# Patient Record
Sex: MALE | Race: WHITE
[De-identification: names, ages, dates, MRNs, and addresses within clinical notes are randomized per-mention and may not be internally consistent; named-entity substitution may affect disease eponyms.]

---

## 2021-02-01 ENCOUNTER — HOSPITAL ENCOUNTER (OUTPATIENT)
Dept: HOSPITAL 90 - WHH | Age: 66
Discharge: HOME | End: 2021-02-01
Attending: FAMILY MEDICINE
Payer: MEDICARE

## 2021-02-01 DIAGNOSIS — I70.90: ICD-10-CM

## 2021-02-01 DIAGNOSIS — E66.9: ICD-10-CM

## 2021-02-01 DIAGNOSIS — N28.9: ICD-10-CM

## 2021-02-01 DIAGNOSIS — M47.815: ICD-10-CM

## 2021-02-01 DIAGNOSIS — E11.628: ICD-10-CM

## 2021-02-01 DIAGNOSIS — L89.154: ICD-10-CM

## 2021-02-01 DIAGNOSIS — E78.5: ICD-10-CM

## 2021-02-01 DIAGNOSIS — L98.492: ICD-10-CM

## 2021-02-01 DIAGNOSIS — E11.622: Primary | ICD-10-CM

## 2021-02-01 PROCEDURE — 97605 NEG PRS WND THER DME<=50SQCM: CPT

## 2021-02-08 ENCOUNTER — HOSPITAL ENCOUNTER (OUTPATIENT)
Dept: HOSPITAL 90 - WHH | Age: 66
Discharge: HOME | End: 2021-02-08
Attending: FAMILY MEDICINE
Payer: MEDICARE

## 2021-02-08 DIAGNOSIS — N28.9: ICD-10-CM

## 2021-02-08 DIAGNOSIS — E78.5: ICD-10-CM

## 2021-02-08 DIAGNOSIS — I70.90: ICD-10-CM

## 2021-02-08 DIAGNOSIS — E11.622: Primary | ICD-10-CM

## 2021-02-08 DIAGNOSIS — E66.9: ICD-10-CM

## 2021-02-08 DIAGNOSIS — E11.628: ICD-10-CM

## 2021-02-08 DIAGNOSIS — L98.492: ICD-10-CM

## 2021-02-08 DIAGNOSIS — L89.154: ICD-10-CM

## 2021-02-08 DIAGNOSIS — M47.815: ICD-10-CM

## 2021-02-08 PROCEDURE — 97605 NEG PRS WND THER DME<=50SQCM: CPT

## 2021-02-22 ENCOUNTER — HOSPITAL ENCOUNTER (OUTPATIENT)
Dept: HOSPITAL 90 - WHH | Age: 66
Discharge: HOME | End: 2021-02-22
Attending: FAMILY MEDICINE
Payer: MEDICARE

## 2021-02-22 DIAGNOSIS — E78.5: ICD-10-CM

## 2021-02-22 DIAGNOSIS — E66.9: ICD-10-CM

## 2021-02-22 DIAGNOSIS — N28.9: ICD-10-CM

## 2021-02-22 DIAGNOSIS — E11.628: ICD-10-CM

## 2021-02-22 DIAGNOSIS — I70.90: ICD-10-CM

## 2021-02-22 DIAGNOSIS — M47.815: ICD-10-CM

## 2021-02-22 DIAGNOSIS — L89.154: ICD-10-CM

## 2021-02-22 DIAGNOSIS — E11.622: Primary | ICD-10-CM

## 2021-02-22 DIAGNOSIS — L98.492: ICD-10-CM

## 2021-02-22 PROCEDURE — 97605 NEG PRS WND THER DME<=50SQCM: CPT

## 2021-03-01 ENCOUNTER — HOSPITAL ENCOUNTER (OUTPATIENT)
Dept: HOSPITAL 90 - WHH | Age: 66
Discharge: HOME | End: 2021-03-01
Attending: FAMILY MEDICINE
Payer: MEDICARE

## 2021-03-01 DIAGNOSIS — E78.5: ICD-10-CM

## 2021-03-01 DIAGNOSIS — N28.9: ICD-10-CM

## 2021-03-01 DIAGNOSIS — E11.622: Primary | ICD-10-CM

## 2021-03-01 DIAGNOSIS — E11.628: ICD-10-CM

## 2021-03-01 DIAGNOSIS — L98.492: ICD-10-CM

## 2021-03-01 DIAGNOSIS — M47.815: ICD-10-CM

## 2021-03-01 DIAGNOSIS — E66.9: ICD-10-CM

## 2021-03-01 DIAGNOSIS — I70.90: ICD-10-CM

## 2021-03-01 DIAGNOSIS — L89.154: ICD-10-CM

## 2021-03-01 PROCEDURE — 97605 NEG PRS WND THER DME<=50SQCM: CPT

## 2021-03-01 PROCEDURE — 11042 DBRDMT SUBQ TIS 1ST 20SQCM/<: CPT

## 2021-03-08 ENCOUNTER — HOSPITAL ENCOUNTER (OUTPATIENT)
Dept: HOSPITAL 90 - WHH | Age: 66
Discharge: HOME | End: 2021-03-08
Attending: FAMILY MEDICINE
Payer: MEDICARE

## 2021-03-08 DIAGNOSIS — E78.5: ICD-10-CM

## 2021-03-08 DIAGNOSIS — L89.154: ICD-10-CM

## 2021-03-08 DIAGNOSIS — L98.492: ICD-10-CM

## 2021-03-08 DIAGNOSIS — M47.815: ICD-10-CM

## 2021-03-08 DIAGNOSIS — I70.90: ICD-10-CM

## 2021-03-08 DIAGNOSIS — N28.9: ICD-10-CM

## 2021-03-08 DIAGNOSIS — E66.9: ICD-10-CM

## 2021-03-08 DIAGNOSIS — E11.622: Primary | ICD-10-CM

## 2021-03-08 DIAGNOSIS — E11.628: ICD-10-CM

## 2021-03-08 PROCEDURE — 97605 NEG PRS WND THER DME<=50SQCM: CPT

## 2021-03-08 PROCEDURE — 11042 DBRDMT SUBQ TIS 1ST 20SQCM/<: CPT

## 2021-03-15 ENCOUNTER — HOSPITAL ENCOUNTER (OUTPATIENT)
Dept: HOSPITAL 90 - WHH | Age: 66
Discharge: HOME | End: 2021-03-15
Attending: FAMILY MEDICINE
Payer: MEDICARE

## 2021-03-15 DIAGNOSIS — L89.154: ICD-10-CM

## 2021-03-15 DIAGNOSIS — E78.5: ICD-10-CM

## 2021-03-15 DIAGNOSIS — E66.9: ICD-10-CM

## 2021-03-15 DIAGNOSIS — N28.9: ICD-10-CM

## 2021-03-15 DIAGNOSIS — E11.628: ICD-10-CM

## 2021-03-15 DIAGNOSIS — I70.90: ICD-10-CM

## 2021-03-15 DIAGNOSIS — E11.622: Primary | ICD-10-CM

## 2021-03-15 DIAGNOSIS — L98.492: ICD-10-CM

## 2021-03-15 DIAGNOSIS — M47.815: ICD-10-CM

## 2021-03-15 PROCEDURE — 97605 NEG PRS WND THER DME<=50SQCM: CPT

## 2021-03-22 ENCOUNTER — HOSPITAL ENCOUNTER (OUTPATIENT)
Dept: HOSPITAL 90 - WHH | Age: 66
Discharge: HOME | End: 2021-03-22
Attending: FAMILY MEDICINE
Payer: MEDICARE

## 2021-03-22 DIAGNOSIS — E66.9: ICD-10-CM

## 2021-03-22 DIAGNOSIS — E78.5: ICD-10-CM

## 2021-03-22 DIAGNOSIS — L98.492: ICD-10-CM

## 2021-03-22 DIAGNOSIS — E11.622: Primary | ICD-10-CM

## 2021-03-22 DIAGNOSIS — I70.90: ICD-10-CM

## 2021-03-22 DIAGNOSIS — M47.815: ICD-10-CM

## 2021-03-22 DIAGNOSIS — E11.628: ICD-10-CM

## 2021-03-22 DIAGNOSIS — N28.9: ICD-10-CM

## 2021-03-22 DIAGNOSIS — L89.154: ICD-10-CM

## 2021-03-22 PROCEDURE — 15271 SKIN SUB GRAFT TRNK/ARM/LEG: CPT

## 2021-03-29 ENCOUNTER — HOSPITAL ENCOUNTER (OUTPATIENT)
Dept: HOSPITAL 90 - WHH | Age: 66
Discharge: HOME | End: 2021-03-29
Attending: FAMILY MEDICINE
Payer: MEDICARE

## 2021-03-29 DIAGNOSIS — E66.9: ICD-10-CM

## 2021-03-29 DIAGNOSIS — E11.628: ICD-10-CM

## 2021-03-29 DIAGNOSIS — L89.154: ICD-10-CM

## 2021-03-29 DIAGNOSIS — E11.622: Primary | ICD-10-CM

## 2021-03-29 DIAGNOSIS — L98.492: ICD-10-CM

## 2021-03-29 DIAGNOSIS — I70.90: ICD-10-CM

## 2021-03-29 DIAGNOSIS — N28.9: ICD-10-CM

## 2021-03-29 DIAGNOSIS — M47.815: ICD-10-CM

## 2021-03-29 DIAGNOSIS — E78.5: ICD-10-CM

## 2021-03-29 PROCEDURE — 15271 SKIN SUB GRAFT TRNK/ARM/LEG: CPT

## 2021-04-05 ENCOUNTER — HOSPITAL ENCOUNTER (OUTPATIENT)
Dept: HOSPITAL 90 - WHH | Age: 66
Discharge: HOME | End: 2021-04-05
Attending: FAMILY MEDICINE
Payer: MEDICARE

## 2021-04-05 DIAGNOSIS — I70.90: ICD-10-CM

## 2021-04-05 DIAGNOSIS — N28.9: ICD-10-CM

## 2021-04-05 DIAGNOSIS — E78.5: ICD-10-CM

## 2021-04-05 DIAGNOSIS — L98.492: ICD-10-CM

## 2021-04-05 DIAGNOSIS — E11.628: ICD-10-CM

## 2021-04-05 DIAGNOSIS — L89.154: ICD-10-CM

## 2021-04-05 DIAGNOSIS — M47.815: ICD-10-CM

## 2021-04-05 DIAGNOSIS — E11.622: Primary | ICD-10-CM

## 2021-04-05 DIAGNOSIS — E66.9: ICD-10-CM

## 2021-04-05 PROCEDURE — 15271 SKIN SUB GRAFT TRNK/ARM/LEG: CPT

## 2021-04-12 ENCOUNTER — HOSPITAL ENCOUNTER (OUTPATIENT)
Dept: HOSPITAL 90 - WHH | Age: 66
Discharge: HOME | End: 2021-04-12
Attending: FAMILY MEDICINE
Payer: MEDICARE

## 2021-04-12 DIAGNOSIS — E11.628: ICD-10-CM

## 2021-04-12 DIAGNOSIS — E66.9: ICD-10-CM

## 2021-04-12 DIAGNOSIS — E11.622: Primary | ICD-10-CM

## 2021-04-12 DIAGNOSIS — E78.5: ICD-10-CM

## 2021-04-12 DIAGNOSIS — N28.9: ICD-10-CM

## 2021-04-12 DIAGNOSIS — L89.154: ICD-10-CM

## 2021-04-12 DIAGNOSIS — I70.90: ICD-10-CM

## 2021-04-12 DIAGNOSIS — M47.815: ICD-10-CM

## 2021-04-12 DIAGNOSIS — L98.492: ICD-10-CM

## 2021-04-12 PROCEDURE — 15271 SKIN SUB GRAFT TRNK/ARM/LEG: CPT

## 2021-04-19 ENCOUNTER — HOSPITAL ENCOUNTER (OUTPATIENT)
Dept: HOSPITAL 90 - WHH | Age: 66
Discharge: HOME | End: 2021-04-19
Attending: FAMILY MEDICINE
Payer: MEDICARE

## 2021-04-19 DIAGNOSIS — E11.628: ICD-10-CM

## 2021-04-19 DIAGNOSIS — E66.9: ICD-10-CM

## 2021-04-19 DIAGNOSIS — M47.815: ICD-10-CM

## 2021-04-19 DIAGNOSIS — E11.622: Primary | ICD-10-CM

## 2021-04-19 DIAGNOSIS — E78.5: ICD-10-CM

## 2021-04-19 DIAGNOSIS — N28.9: ICD-10-CM

## 2021-04-19 DIAGNOSIS — I70.90: ICD-10-CM

## 2021-04-19 DIAGNOSIS — L98.492: ICD-10-CM

## 2021-04-19 DIAGNOSIS — L89.154: ICD-10-CM

## 2021-04-19 PROCEDURE — 15271 SKIN SUB GRAFT TRNK/ARM/LEG: CPT

## 2021-04-26 ENCOUNTER — HOSPITAL ENCOUNTER (OUTPATIENT)
Dept: HOSPITAL 90 - WHH | Age: 66
Discharge: HOME | End: 2021-04-26
Attending: FAMILY MEDICINE
Payer: MEDICARE

## 2021-04-26 DIAGNOSIS — I70.90: ICD-10-CM

## 2021-04-26 DIAGNOSIS — E66.9: ICD-10-CM

## 2021-04-26 DIAGNOSIS — L89.154: ICD-10-CM

## 2021-04-26 DIAGNOSIS — E11.628: ICD-10-CM

## 2021-04-26 DIAGNOSIS — M47.815: ICD-10-CM

## 2021-04-26 DIAGNOSIS — N28.9: ICD-10-CM

## 2021-04-26 DIAGNOSIS — L98.492: ICD-10-CM

## 2021-04-26 DIAGNOSIS — E11.622: Primary | ICD-10-CM

## 2021-04-26 DIAGNOSIS — E78.5: ICD-10-CM

## 2021-05-03 ENCOUNTER — HOSPITAL ENCOUNTER (OUTPATIENT)
Dept: HOSPITAL 90 - WHH | Age: 66
Discharge: HOME | End: 2021-05-03
Attending: FAMILY MEDICINE
Payer: MEDICARE

## 2021-05-03 DIAGNOSIS — L89.154: ICD-10-CM

## 2021-05-03 DIAGNOSIS — E66.9: ICD-10-CM

## 2021-05-03 DIAGNOSIS — I70.90: ICD-10-CM

## 2021-05-03 DIAGNOSIS — L98.492: ICD-10-CM

## 2021-05-03 DIAGNOSIS — M47.815: ICD-10-CM

## 2021-05-03 DIAGNOSIS — N28.9: ICD-10-CM

## 2021-05-03 DIAGNOSIS — E11.628: ICD-10-CM

## 2021-05-03 DIAGNOSIS — E11.622: Primary | ICD-10-CM

## 2021-05-03 DIAGNOSIS — E78.5: ICD-10-CM

## 2021-05-03 PROCEDURE — 11042 DBRDMT SUBQ TIS 1ST 20SQCM/<: CPT

## 2021-05-10 ENCOUNTER — HOSPITAL ENCOUNTER (OUTPATIENT)
Dept: HOSPITAL 90 - WHH | Age: 66
Discharge: HOME | End: 2021-05-10
Attending: FAMILY MEDICINE
Payer: MEDICARE

## 2021-05-10 DIAGNOSIS — E66.9: ICD-10-CM

## 2021-05-10 DIAGNOSIS — E11.622: Primary | ICD-10-CM

## 2021-05-10 DIAGNOSIS — N28.9: ICD-10-CM

## 2021-05-10 DIAGNOSIS — L89.154: ICD-10-CM

## 2021-05-10 DIAGNOSIS — E78.5: ICD-10-CM

## 2021-05-10 DIAGNOSIS — M47.815: ICD-10-CM

## 2021-05-10 DIAGNOSIS — I70.90: ICD-10-CM

## 2021-05-10 DIAGNOSIS — E11.628: ICD-10-CM

## 2021-05-10 DIAGNOSIS — L98.492: ICD-10-CM

## 2021-05-17 ENCOUNTER — HOSPITAL ENCOUNTER (OUTPATIENT)
Dept: HOSPITAL 90 - WHH | Age: 66
Discharge: HOME | End: 2021-05-17
Attending: FAMILY MEDICINE
Payer: MEDICARE

## 2021-05-17 DIAGNOSIS — E66.9: ICD-10-CM

## 2021-05-17 DIAGNOSIS — N28.9: ICD-10-CM

## 2021-05-17 DIAGNOSIS — L98.492: ICD-10-CM

## 2021-05-17 DIAGNOSIS — L89.154: ICD-10-CM

## 2021-05-17 DIAGNOSIS — I70.90: ICD-10-CM

## 2021-05-17 DIAGNOSIS — E78.5: ICD-10-CM

## 2021-05-17 DIAGNOSIS — E11.628: ICD-10-CM

## 2021-05-17 DIAGNOSIS — M47.815: ICD-10-CM

## 2021-05-17 DIAGNOSIS — E11.622: Primary | ICD-10-CM

## 2024-08-16 ENCOUNTER — HOSPITAL ENCOUNTER (OUTPATIENT)
Dept: HOSPITAL 90 - RAH | Age: 69
Discharge: HOME | End: 2024-08-16
Attending: INTERNAL MEDICINE
Payer: MEDICARE

## 2024-08-16 ENCOUNTER — HOSPITAL ENCOUNTER (OUTPATIENT)
Dept: HOSPITAL 90 - RAH | Age: 69
Discharge: HOME | End: 2024-08-16
Attending: INTERNAL MEDICINE
Payer: COMMERCIAL

## 2024-08-16 DIAGNOSIS — I10: Primary | ICD-10-CM

## 2024-08-16 DIAGNOSIS — Z13.6: Primary | ICD-10-CM

## 2024-08-16 PROCEDURE — 75571 CT HRT W/O DYE W/CA TEST: CPT

## 2024-08-16 PROCEDURE — 93306 TTE W/DOPPLER COMPLETE: CPT

## 2024-09-17 ENCOUNTER — HOSPITAL ENCOUNTER (OUTPATIENT)
Dept: HOSPITAL 90 - SHCH | Age: 69
Discharge: HOME | End: 2024-09-17
Attending: INTERNAL MEDICINE
Payer: MEDICARE

## 2024-09-17 DIAGNOSIS — I65.23: ICD-10-CM

## 2024-09-17 DIAGNOSIS — R09.89: Primary | ICD-10-CM

## 2024-09-17 PROCEDURE — 93880 EXTRACRANIAL BILAT STUDY: CPT

## 2024-10-02 ENCOUNTER — HOSPITAL ENCOUNTER (OUTPATIENT)
Dept: HOSPITAL 90 - RAH | Age: 69
Discharge: HOME | End: 2024-10-02
Attending: FAMILY MEDICINE
Payer: MEDICARE

## 2024-10-02 DIAGNOSIS — R06.09: ICD-10-CM

## 2024-10-02 DIAGNOSIS — R14.0: Primary | ICD-10-CM

## 2024-10-02 DIAGNOSIS — R05.3: ICD-10-CM

## 2024-10-02 PROCEDURE — 74021 RADEX ABDOMEN 3+ VIEWS: CPT

## 2024-10-02 PROCEDURE — 71046 X-RAY EXAM CHEST 2 VIEWS: CPT

## 2025-03-16 ENCOUNTER — HOSPITAL ENCOUNTER (EMERGENCY)
Dept: HOSPITAL 90 - EDH | Age: 70
Discharge: HOME | End: 2025-03-16
Payer: MEDICARE

## 2025-03-16 VITALS
HEART RATE: 71 BPM | TEMPERATURE: 97.1 F | DIASTOLIC BLOOD PRESSURE: 68 MMHG | SYSTOLIC BLOOD PRESSURE: 137 MMHG | OXYGEN SATURATION: 98 % | RESPIRATION RATE: 20 BRPM

## 2025-03-16 VITALS — BODY MASS INDEX: 37.77 KG/M2 | HEIGHT: 66 IN | WEIGHT: 235.01 LBS

## 2025-03-16 DIAGNOSIS — S61.211A: Primary | ICD-10-CM
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
## 2025-03-16 DIAGNOSIS — Z79.82: ICD-10-CM
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
## 2025-03-16 DIAGNOSIS — Z79.899: ICD-10-CM

## 2025-03-16 DIAGNOSIS — Z79.84: ICD-10-CM

## 2025-03-16 DIAGNOSIS — Z88.5: ICD-10-CM

## 2025-03-16 PROCEDURE — 73140 X-RAY EXAM OF FINGER(S): CPT

## 2025-03-16 PROCEDURE — 12002 RPR S/N/AX/GEN/TRNK2.6-7.5CM: CPT

## 2025-03-16 PROCEDURE — 99283 EMERGENCY DEPT VISIT LOW MDM: CPT

## 2025-03-16 PROCEDURE — 12042 INTMD RPR N-HF/GENIT2.6-7.5: CPT

## 2025-03-16 PROCEDURE — 99284 EMERGENCY DEPT VISIT MOD MDM: CPT

## 2025-03-16 RX ADMIN — LIDOCAINE HYDROCHLORIDE STA ML: 10 INJECTION, SOLUTION INFILTRATION; PERINEURAL at 16:26

## 2025-03-16 RX ADMIN — NEOMYCIN AND POLYMYXIN B SULFATES AND BACITRACIN ZINC ONE APPL: 400; 3.5; 5 OINTMENT TOPICAL at 16:26

## 2025-03-16 NOTE — ERN
General


Stated Complaint:  CUT LEFT FINGER WITH 


Time Seen by MD:  15:40


Source:  patient





History of Present Illness


Initial Comments


PATIENT IS A 69-YEAR-OLD MALE COMING IN TO BE EVALUATED FOR LEFT HAND 2ND DIGIT 

WOUND.  PER PATIENT HE WAS GRINDING AND ACCIDENTALLY HIT HIMSELF IN THE 2ND 

DIGIT.


Allergies:  


Coded Allergies:  


     No Known Allergies (Verified  Allergy, Unknown, 4/12/23)


     morphine (Unverified  Allergy, Unknown, 4/10/15)


   HALLUCINATIONS


Home Meds


Reported Medications


[small pill]   No Conflict Check


   12/5/20


Vit B Cmplx 3/FA/Vit C/Biotin (Angeles-Brad Rx Tablet) 1 Each Tablet, 1 EACH PO 

DAILY, TAB


   12/5/20


Glipizide (Glipizide ER) 5 Mg Tab.er.24, 5 MG PO BID


   12/5/20


Allopurinol (Allopurinol) 300 Mg Tablet, 300 MG PO ONCE, TAB


   12/5/20


Gemfibrozil (Gemfibrozil) 600 Mg Tablet, 600 MG PO BID, TAB


   12/5/20


Aspirin (Aspirin) 81 Mg Tab.chew, 81 MG PO DAILY, TAB.CHEW


   12/5/20


Furosemide (Furosemide) 40 Mg Tablet, 40 MG PO DAILY, TAB


   12/5/20


Pravastatin Sodium (Pravastatin Sodium) 80 Mg Tablet, 80 MG PO DAILY, TAB


   12/5/20


Benazepril HCl (Benazepril HCl) 40 Mg Tablet, 40 MG PO DAILY, TAB


   12/5/20


Sodium Bicarbonate (Sodium Bicarbonate) 650 Mg Tablet, 650 MG PO BID, TAB


   12/5/20


[Fish Oil]   No Conflict Check, PO AM


   4/10/15


Benazepril HCl (Benazepril HCl) 40 Mg Tablet, 40 MG PO HS, TAB


   4/10/15


Cholecalciferol (Vitamin D3) (Vitamin D-3) 2,000 Unit Capsule, 1000 UNIT PO AM, 

CAP


   4/10/15


Multivitamin (Multivitamins) 1 Each Tablet, 1 EACH PO AM, TAB


   4/10/15


[Angeles Brad]   No Conflict Check, PO AM


   4/10/15


Aspirin (ASPIRIN 81 MG ECTAB) 81 Mg Ectab, 81 MG PO DAILY, TAB.EC


   4/10/15


Pravastatin Sodium (Pravastatin Sodium) 80 Mg Tablet, 80 MG PO HS, TAB


   4/10/15


Allopurinol (Allopurinol) 300 Mg Tablet, 300 MG PO AM, TAB


   4/10/15


Gemfibrozil (Gemfibrozil) 600 Mg Tablet, 600 MG PO BID, TAB


   4/10/15


Furosemide (Furosemide) 40 Mg Tablet, 40 MG PO AM, TAB


   4/10/15


Glipizide (Glipizide ER) 5 Mg Tab.er.24, 5 MG PO BID


   4/10/15





ROS Dictation


CONSTITUTIONAL:  NO CHILLS, NO FEVER, NO WEAKNESS, NO DIAPHORESIS, NO MALAISE.


HEAD/FACE:  NO SIGNS OF TRAUMA. 


EENT:  NO EYE PAIN, NO BLURRED VISION, NO TEARING, NO DOUBLE VISION, NO EAR 

PAIN, NO EAR DISCHARGE, NO NOSE PAIN, NO NASAL CONGESTION, NO THROAT PAIN, NO 

THROAT SWELLING, NO MOUTH PAIN. 


RESPIRATORY:  NO COUGH, NO ORTHOPNEA, NO SOB, NO STRIDOR, NO WHEEZING. 


CARDIOVASCULAR:  NO CHEST PAIN, NO EDEMA, NO PALPITATIONS, NO SYNCOPE. 


GASTROINTESTINAL/ABDOMINAL:   NO ABDOMINAL PAIN, NO CONSTIPATION, NO DIARRHEA, 

NO NAUSEA, NO VOMITING. 


GENITOURINARY:  NO ABNORMAL DISCHARGE, NO DYSURIA, NO FREQUENT URINATION, NO 

HEMATURIA. NO COMPLAINTS OF PAIN IN THE GENITALS. 


MUSCULOSKELETAL:  NO BACK PAIN, NO GOUT, NO JOINT PAIN, NO JOINT SWELLING, NO 

MUSCLE PAIN, NO MUSCLE STIFFNESS, NO NECK PAIN. 


INTEGUMENTARY:  NO CHANGE IN COLOR, NO CHANGE IN HAIR/NAILS, NO DRYNESS, LESION,

NO LUMPS, NO RASH. 


NEUROLOGICAL/PSYCH:  NO ANXIETY, NOT DEPRESSED, NO EMOTIONAL PROBLEM, NO 

HEADACHE, NO NUMBNESS, NO PRE-EXISTING DEFICIT, NO HISTORY OF SEIZURES,  NO 

TREMORS, NO WEAKNESS. 


HEMATOLOGIC/LYMPHATIC:  NOT ANEMIC, NO HISTORY OF BLOOD CLOTS, NO APPARENT 

BLEEDING, NO BRUISING, GLANDS NOT SWOLLEN.


ALL SYSTEMS NEGATIVE, EXCEPT AS NOTED.





Physical Exam


Physical Exam Dictation


VITAL SIGNS:  REVIEWED. 


GENERAL APPEARANCE:  ALERT, ORIENTED X3, NO ACUTE DISTRESS, OBESE.


HEAD AND FACE: NON-TRAUMATIC. 


EYES:   PERRL, PINK CONJUNCTIVAS, EYELID NO TRAUMA, ANTERIOR CHAMBER CLEAR. 


EARS:  PINNAS INTACT AND NO SIGNS OF TRAUMA OR ERYTHEMA.  EAR CANALS CLEAR AND 

NO DISCHARGE. TMS NO ERYTHEMA. 


NOSE:  NO DISCHARGE, NO BLEEDING. 


OROPHARYNX:  MOUTH NORMAL, TEETH NO CARIES, TONGUE PINK.  PHARYNX CLEAR, NO 

ERYTHEMA.  TONSILS NO EXUDATES, NO ABSCESSES NOTED. MUCOUS MEMBRANE MOIST.


NECK:  SUPPLE, NON-TENDER, NO THYROMEGALY, NO MASSES, NO JVD, NO BRUITS. 


BREAST:  DEFERRED.


CHEST:   NO TENDERNESS, NO CREPITUS, NO PARADOXICAL MOVEMENT, NO RETRACTIONS.


LUNGS:  CLEAR, WELL-VENTILATED, SYMMETRIC, NO RALES, NO WHEEZING, NO RHONCHI, NO

STRIDOR, GOOD BREATH SOUNDS BILATERALLY. 


HEART:  REGULAR RATE, REGULAR RHYTHM, NO MURMUR, NO GALLOPS. 


VASCULAR: NO PERIPHERAL EDEMA.


ABDOMEN: SOFT, POSITIVE BOWEL SOUNDS, NONDISTENDED, NO GUARDING, NONTENDER, NO 

REBOUND, NO MASSES NO HEPATOMEGALY, NO SPLENOMEGALY, NO RESTREPO'S SIGN, NO 

HERNIAS. 


RECTAL: DEFERRED. 


GENITAL:  DEFERRED. 


NEUROLOGICAL:  NORMAL SPEECH, GROSS MOTOR FUNCTION INTACT, GROSS SENSORY 

FUNCTION INTACT.


MUSCULOSKELETAL:  NECK NONTENDER, FULL RANGE OF MOTION, BACK NONTENDER, FULL 

RANGE OF MOTION.


EXTREMITIES: NONTENDER, FULL RANGE OF MOTION. 


SKIN:  COLOR PINK, DRY, NO TURGOR, NO RASH, NO LACERATIONS, NO ABRASIONS, NO 

CONTUSIONS.


LYMPHATICS:  DEFERRED.





Results


Laboratory and Microbiology


Labs Reviewed?:  Yes





EKG/XRAY/US/CT/MRI


X-RAY Comment


HAND 2ND DIGIT X-RAY-NAD





MDM


MDM: 


DIFFERENTIAL DIAGNOSIS:  FINGER LACERATION, ABRASION, SKIN AVULSION,


PATIENT IS A 69-YEAR-OLD GENTLEMAN COMING IN TO BE EVALUATED FOR LEFT HAND 2ND 

DIGIT LACERATION.  PATIENT STATES HE HIT HIMSELF WITH A BLADE OF A  IN 

HIS HERE FOR FURTHER EVALUATION.  ON PHYSICAL EXAM LACERATION IS 4 CM IN LENGTH.

 ARTERIAL BLEED WAS PRESENT.  CONTAMINANT WERE PRESENT WHICH WERE CLEANED A 

REMOVED.  LIDOCAINE 1% 5 ML WERE USED FOR A DIGITAL BLOCK.  GOOD ANESTHESIA 

ACHIEVED.  SEVEN RUNNING SUTURES WITH VESSEL LIGATION HEMOSTASIS OBTAINED.  

PATIENT WILL BE DISCHARGED IN STABLE CONDITION.


ED Course





Orders








Procedure Category Date Status





  Time 


 


Finger(S) 2+Vws Lt RAD 3/16/25 Taken





  15:54 


 


Lidocaine Hcl 1% 20ml PHA 3/16/25 Complete





Vial (Lidocaine Hc  15:54 


 


Neomy PHA 3/16/25 Complete





Sulf/Bacitra/Polymyxin  16:30 








Current Medications








 Medications


  (Trade)  Dose


 Ordered  Sig/Simona


 Route


 PRN Reason  Start Time


 Stop Time Status Last Admin


Dose Admin


 


 Lidocaine HCl


  (Lidocaine HCl


 1% 20ml Vial)  20 ml  ONCE  STAT


 INJ


   3/16/25 15:54


 3/16/25 16:03 DC 3/16/25 16:26





 


 Neomycin/


 Polymyxin/


 Bacitracin


  (Triple


 Antibiotic


 Ointment)  1 appl  ONCE  ONCE


 TP


   3/16/25 16:30


 3/16/25 16:31 DC 3/16/25 16:26











Vital Signs








  Date Time  Temp Pulse Resp B/P (MAP) Pulse Ox O2 Delivery O2 Flow Rate FiO2


 


3/16/25 16:07 97.9 74 18 145/68 98 Room Air* 0 21


 


3/16/25 16:07 98.8 84 20 142/65 98 Room Air 0 











Laceration/Wound Repair


Laceration/Wound Repair :  


   Wound Location:  upper extremity


   Wound Length (cm):  4


   Wound's Depth, Shape:  superficial


   Wound Explored:  foreign body removed


   Irrigated w/ Saline (ccs):  100


   Anesthesia:  1% Lidocaine


   Volume Anesthetic (ccs):  5


   Wound Debrided:  minimal


   Wound Repaired With:  sutures


   Suture Size/Type:  4:0


   Number of Sutures:  7


   Layer Closure?:  Yes





DX & DISP


Disposition:  Discharge


Departure


Impression:  


   Primary Impression:  Finger laceration


Condition:  Stable





Additional Instructions:  


FOLLOW-UP WITH PRIMARY CARE PROVIDER IN 1 TO 2 DAYS.  TAKE MEDICATIONS AS 

DIRECTED HERE IN THE EMERGENCY ROOM.  OKAY TO CONTINUE HOME MEDICATIONS UNLESS 

OTHERWISE DISCUSSED DURING YOUR VISIT IN THE EMERGENCY ROOM TODAY.  RETURN TO 

YOUR NEAREST EMERGENCY ROOM IF SYMPTOMS WORSEN OR IF THERE IS NO IMPROVEMENT.  

CALL 911 IF YOU NEED IMMEDIATE ASSISTANCE.  TAKE TYLENOL  OVER-THE-COUNTER AS 

NEEDED AND IF NO CONTRAINDICATIONS ARE PRESENT.  INCREASE ORAL HYDRATION.  A 

WOUND CULTURE OR URINE CULTURE WAS ORDERED HERE IN THE EMERGENCY ROOM DEPARTMENT

PLEASE FOLLOW-UP WITH PRIMARY CARE PROVIDER AND ADVISE THEM TO GET REPEAT PORTS 

FROM OUR FACILITY.  IF YOU HAD ANY ACE WRAP/SPLINTS THAT WERE APPLIED HERE, 

PLEASE DO NOT REMOVE THEM UNTIL YOU SEE YOUR PRIMARY CARE OR SPECIALTY.





REFERRALS:


Referrals:  


ANTON PAREDES MD (PCP)


Time of Disposition:  16:44











CITLALLI AGRAWAL MD              Mar 16, 2025 15:56 no

## 2025-03-16 NOTE — HMCIMG
FINGER(S) 2+VWS LT



CLINICAL HISTORY: laceration 



COMPARISON: None 



TECHNIQUE: AP lateral and oblique images were obtained.



FINDINGS: There is irregularity of the soft tissue of the second digit

with a punctate hyperdensity likely represents a foreign body in the

soft tissue. The bony structures are unremarkable.



IMPRESSION: Soft tissue laceration with punctate foreign body.

## 2025-06-04 ENCOUNTER — HOSPITAL ENCOUNTER (OUTPATIENT)
Dept: HOSPITAL 90 - RAH | Age: 70
Discharge: HOME | End: 2025-06-04
Attending: FAMILY MEDICINE
Payer: MEDICARE

## 2025-06-04 DIAGNOSIS — M54.16: ICD-10-CM

## 2025-06-04 DIAGNOSIS — M48.061: Primary | ICD-10-CM

## 2025-06-04 PROCEDURE — 72148 MRI LUMBAR SPINE W/O DYE: CPT
